# Patient Record
Sex: MALE | Race: WHITE | NOT HISPANIC OR LATINO | Employment: OTHER | URBAN - METROPOLITAN AREA
[De-identification: names, ages, dates, MRNs, and addresses within clinical notes are randomized per-mention and may not be internally consistent; named-entity substitution may affect disease eponyms.]

---

## 2021-05-27 ENCOUNTER — RECORDS - HEALTHEAST (OUTPATIENT)
Dept: ADMINISTRATIVE | Facility: CLINIC | Age: 76
End: 2021-05-27

## 2022-07-15 ENCOUNTER — HOSPITAL ENCOUNTER (OUTPATIENT)
Dept: CT IMAGING | Facility: HOSPITAL | Age: 77
Discharge: HOME | End: 2022-07-15
Admitting: RADIOLOGY
Payer: MEDICARE

## 2022-07-15 DIAGNOSIS — N20.0 CALCULUS OF KIDNEY: ICD-10-CM

## 2022-07-15 PROCEDURE — 74176 CT ABD & PELVIS W/O CONTRAST: CPT

## 2023-10-22 ENCOUNTER — HOSPITAL ENCOUNTER (EMERGENCY)
Facility: HOSPITAL | Age: 78
Discharge: HOME | End: 2023-10-22
Attending: EMERGENCY MEDICINE | Admitting: EMERGENCY MEDICINE
Payer: MEDICARE

## 2023-10-22 ENCOUNTER — APPOINTMENT (OUTPATIENT)
Dept: CT IMAGING | Facility: HOSPITAL | Age: 78
End: 2023-10-22
Attending: EMERGENCY MEDICINE
Payer: MEDICARE

## 2023-10-22 VITALS
RESPIRATION RATE: 16 BRPM | SYSTOLIC BLOOD PRESSURE: 167 MMHG | DIASTOLIC BLOOD PRESSURE: 76 MMHG | OXYGEN SATURATION: 96 % | HEART RATE: 71 BPM | TEMPERATURE: 98.8 F | WEIGHT: 240 LBS | HEIGHT: 70 IN | BODY MASS INDEX: 34.36 KG/M2

## 2023-10-22 DIAGNOSIS — N20.1 URETEROLITHIASIS: ICD-10-CM

## 2023-10-22 DIAGNOSIS — R10.9 FLANK PAIN: ICD-10-CM

## 2023-10-22 LAB
ALBUMIN SERPL BCG-MCNC: 4.3 G/DL (ref 3.5–5.2)
ALBUMIN UR-MCNC: 10 MG/DL
ALP SERPL-CCNC: 64 U/L (ref 40–129)
ALT SERPL W P-5'-P-CCNC: 22 U/L (ref 0–70)
ANION GAP SERPL CALCULATED.3IONS-SCNC: 10 MMOL/L (ref 7–15)
APPEARANCE UR: CLEAR
AST SERPL W P-5'-P-CCNC: 21 U/L (ref 0–45)
BILIRUB SERPL-MCNC: 1.4 MG/DL
BILIRUB UR QL STRIP: NEGATIVE
BUN SERPL-MCNC: 17.4 MG/DL (ref 8–23)
CALCIUM SERPL-MCNC: 9.8 MG/DL (ref 8.8–10.2)
CHLORIDE SERPL-SCNC: 105 MMOL/L (ref 98–107)
COLOR UR AUTO: ABNORMAL
CREAT SERPL-MCNC: 1.09 MG/DL (ref 0.67–1.17)
CRP SERPL-MCNC: 6.8 MG/L
DEPRECATED HCO3 PLAS-SCNC: 26 MMOL/L (ref 22–29)
EGFRCR SERPLBLD CKD-EPI 2021: 69 ML/MIN/1.73M2
ERYTHROCYTE [DISTWIDTH] IN BLOOD BY AUTOMATED COUNT: 13.8 % (ref 10–15)
GLUCOSE SERPL-MCNC: 136 MG/DL (ref 70–99)
GLUCOSE UR STRIP-MCNC: NEGATIVE MG/DL
HCT VFR BLD AUTO: 43.9 % (ref 40–53)
HGB BLD-MCNC: 14.4 G/DL (ref 13.3–17.7)
HGB UR QL STRIP: ABNORMAL
KETONES UR STRIP-MCNC: NEGATIVE MG/DL
LEUKOCYTE ESTERASE UR QL STRIP: NEGATIVE
LIPASE SERPL-CCNC: 13 U/L (ref 13–60)
MCH RBC QN AUTO: 32.7 PG (ref 26.5–33)
MCHC RBC AUTO-ENTMCNC: 32.8 G/DL (ref 31.5–36.5)
MCV RBC AUTO: 100 FL (ref 78–100)
MUCOUS THREADS #/AREA URNS LPF: PRESENT /LPF
NITRATE UR QL: NEGATIVE
PH UR STRIP: 5 [PH] (ref 5–7)
PLATELET # BLD AUTO: 156 10E3/UL (ref 150–450)
POTASSIUM SERPL-SCNC: 4.1 MMOL/L (ref 3.4–5.3)
PROT SERPL-MCNC: 7.7 G/DL (ref 6.4–8.3)
RBC # BLD AUTO: 4.4 10E6/UL (ref 4.4–5.9)
RBC URINE: 68 /HPF
SODIUM SERPL-SCNC: 141 MMOL/L (ref 135–145)
SP GR UR STRIP: 1.02 (ref 1–1.03)
SQUAMOUS EPITHELIAL: <1 /HPF
UROBILINOGEN UR STRIP-MCNC: <2 MG/DL
WBC # BLD AUTO: 7 10E3/UL (ref 4–11)
WBC URINE: 3 /HPF

## 2023-10-22 PROCEDURE — 83690 ASSAY OF LIPASE: CPT | Performed by: EMERGENCY MEDICINE

## 2023-10-22 PROCEDURE — 85027 COMPLETE CBC AUTOMATED: CPT | Performed by: EMERGENCY MEDICINE

## 2023-10-22 PROCEDURE — 250N000011 HC RX IP 250 OP 636: Performed by: EMERGENCY MEDICINE

## 2023-10-22 PROCEDURE — 99285 EMERGENCY DEPT VISIT HI MDM: CPT | Mod: 25

## 2023-10-22 PROCEDURE — 80053 COMPREHEN METABOLIC PANEL: CPT | Performed by: EMERGENCY MEDICINE

## 2023-10-22 PROCEDURE — 86140 C-REACTIVE PROTEIN: CPT | Performed by: EMERGENCY MEDICINE

## 2023-10-22 PROCEDURE — 36415 COLL VENOUS BLD VENIPUNCTURE: CPT | Performed by: EMERGENCY MEDICINE

## 2023-10-22 PROCEDURE — 96374 THER/PROPH/DIAG INJ IV PUSH: CPT

## 2023-10-22 PROCEDURE — 81001 URINALYSIS AUTO W/SCOPE: CPT | Performed by: EMERGENCY MEDICINE

## 2023-10-22 PROCEDURE — 74176 CT ABD & PELVIS W/O CONTRAST: CPT | Mod: MG

## 2023-10-22 RX ORDER — IBUPROFEN 200 MG
400 TABLET ORAL EVERY 6 HOURS
Qty: 56 TABLET | Refills: 0 | Status: SHIPPED | OUTPATIENT
Start: 2023-10-22 | End: 2023-10-29

## 2023-10-22 RX ORDER — OXYCODONE HYDROCHLORIDE 5 MG/1
5 TABLET ORAL EVERY 4 HOURS PRN
Qty: 12 TABLET | Refills: 0 | Status: SHIPPED | OUTPATIENT
Start: 2023-10-22 | End: 2023-10-26

## 2023-10-22 RX ORDER — KETOROLAC TROMETHAMINE 15 MG/ML
15 INJECTION, SOLUTION INTRAMUSCULAR; INTRAVENOUS ONCE
Status: COMPLETED | OUTPATIENT
Start: 2023-10-22 | End: 2023-10-22

## 2023-10-22 RX ORDER — DIMENHYDRINATE 50 MG
50 TABLET ORAL EVERY 6 HOURS PRN
Qty: 28 TABLET | Refills: 0 | Status: SHIPPED | OUTPATIENT
Start: 2023-10-22 | End: 2023-10-29

## 2023-10-22 RX ORDER — ACETAMINOPHEN 500 MG
1000 TABLET ORAL EVERY 6 HOURS
Qty: 56 TABLET | Refills: 0 | Status: SHIPPED | OUTPATIENT
Start: 2023-10-22 | End: 2023-10-29

## 2023-10-22 RX ORDER — DIMENHYDRINATE 50 MG
50 TABLET ORAL AT BEDTIME
Qty: 7 TABLET | Refills: 0 | Status: SHIPPED | OUTPATIENT
Start: 2023-10-22 | End: 2023-10-29

## 2023-10-22 RX ADMIN — KETOROLAC TROMETHAMINE 15 MG: 15 INJECTION INTRAMUSCULAR; INTRAVENOUS at 04:33

## 2023-10-22 ASSESSMENT — ACTIVITIES OF DAILY LIVING (ADL): ADLS_ACUITY_SCORE: 33

## 2023-10-22 NOTE — ED TRIAGE NOTES
Pt reports onset of right sided flank pain on Wednesday. Pt reports it has been getting worse. Hx of prior kidney stones. Pt also reports being constipated. Last BM was on Wednesday. Also reports being nauseated and abdominal bloating. No hx of bowel obstructions.

## 2023-10-22 NOTE — DISCHARGE INSTRUCTIONS
You were seen in the Emergency Department today for flank pain. As we discussed, your CT scan showed that you have a stone in your ureter. It is painful as the stone travels down from your kidney toward your bladder. Your urine did not look infected.      You are being sent with prescriptions for medications to help with recurrent symptoms. Please take as directed. You are also being sent with a strainer to use when you urinate so you can try to catch the stone.      Please return to the ER if you experience fever, worsening pain, inability to keep food/fluids down, and/or for any other new or concerning symptoms, otherwise please follow up with your primary doctor and urologist for recheck. You were given a referral to the [facility] and someone should call your from their clinic to schedule an appointment.    Below is some information you might find useful.     Thank you for choosing [facility]. It was a pleasure taking care of you!

## 2023-10-22 NOTE — ED PROVIDER NOTES
EMERGENCY DEPARTMENT ENCOUNTER      NAME: Poncho Weems  YOB: 1945  MRN: [id number]    FINAL IMPRESSION  1. Ureterolithiasis    2. Flank pain        MEDICAL DECISION MAKING   Pertinent Labs & Imaging studies reviewed. (See chart for details)    Poncho Weems is a 78 year old male who presents for evaluation of flank pain and constipation that been ongoing for the last 4 days.  He reports some associated nausea but denies emesis.  He states that he has not had a bowel movement since his symptoms began which is somewhat atypical for him.  Patient notes that his current symptoms do feel similar to what he has experienced with past kidney stones.  He notes that when he last had imaging, he was told that he had multiple stones remaining in his kidneys.  No associated fever, dysuria, hematuria, or other new complaints. Remainder of history and exam, as below.     Records reviewed.  Patient does have a history of ureterolithiasis/nephrolithiasis.  He has been seen by urology in the past, most recently in August 2021 by Dr. Kirkland.  He was also seen in [place] [facility] clinic on 8/2/2022.  At that time, it was noted that patient had a CT abdomen/pelvis on 6/30/2022 which revealed a stone on the right and 3 stones on the left.  He has a history of cystoscopy, ureteroscopy, and laser lithotripsy in 2021.  He is on tamsulosin 0.4 mg daily.    I considered a broad differential including but not limited to nephrolithiasis/ureterolithiasis, renal colic, pyelonephritis, UTI, hydronephrosis, thoracic or abdominal wall strain, SPARKLE, electrolyte derangement, constipation, obstruction, hernia. I have low suspicion for AAA/dissection or other cardiovascular process given history and exam. Discussed options for workup and management. We agreed on plan for labs, UA, CT abdomen/pelvis.  Patient declined offer for analgesic/antiemetic but will update me if he changes his mind.      ED Course as of 10/22/23 0452   Sun  Oct 22, 2023   0412 UA with Microscopic reflex to Culture(!)  UA notable for blood and RBCs, suggestive of ureterolithiasis.  No evidence of infection.   0412 Comprehensive metabolic panel(!)  CMP reassuring. No evidence of SPARKLE, acidosis, or significant electrolyte derangement. No acute elevation of bilirubin or transaminates to suggest acute hepatobiliary process.   0412 CRP Inflammation(!): 6.80  Concerning for infectious/inflammatory process, possibly stone.   0412 CBC (+ platelets, no diff)  CBC reassuring. No evidence of leukocytosis to suggest systemic infectious/inflammatory process. No acute anemia. PLTs wnl.   0412 Lipase: 13  Lipase within normal limits, symptoms less likely related to acute pancreatitis.   0427 CT Abdomen Pelvis w/o Contrast  CT with 4 mm stone at the right UVJ and associated hydronephrosis.  I suspect this to be etiology of patient's pain as well as hematuria.     Work-up today was notable for the above.  I suspect symptoms are related to 4 mm stone with concurrent constipation.  Patient did have elevated blood pressures while here in the department and some increasing pain after he returned from CT scan.  He was given Toradol with significant improvement in discomfort as well as blood pressure, down to the 160s systolic on recheck.  I reviewed results of labs, UA, and imaging.  We discussed options for ongoing management and I offered admission for symptom control and urology consultation but patient felt comfortable with plan for discharge.  He has been seen by [place] [facility] in the past but was also interested in a referral to [facility].  We will send with prescriptions for analgesic/antiemetic.  Patient is already taking tamsulosin and I encouraged him to continue doing so.    Strict return precautions and follow up recommendations were discussed and all questions were answered. Patient endorsed understanding and was in agreement with plan.        Medical Decision  Making    History:  Supplemental history from: Family Member/Significant Other  External Record(s) reviewed: Documented in chart, if applicable. and Prior Imaging: CT Abdomen Pelvis W/O Contrast performed at [facility] on 07/15/22    Work Up:  Chart documentation includes differential considered and any EKGs or imaging independently interpreted by provider, where specified.  In additional to work up documented, I considered the following work up: Documented in chart, if applicable.    External consultation:  Discussion of management with another provider: N/A    Complicating factors:  Care impacted by chronic illness: N/A  Care affected by social determinants of health: Access to Medical Care    Disposition considerations: Discharge. I prescribed additional prescription strength medication(s) as charted. See documentation for any additional details.      ED COURSE  3:07 AM I met with the patient to gather history and to perform my initial exam. We discussed plans for the ED course, including diagnostic testing and treatment.   4:21 AM Rechecked and updated patient.   4:27 AM Rechecked and updated patient. I discussed plans for discharge with the patient, which they were agreeable to. We discussed supportive cares at home and reasons for return to the ER including new or worsening symptoms. All questions and concerns were addressed. Patient to be discharged by RN.       MEDICATIONS GIVEN IN THE ED  Medications   ketorolac (TORADOL) injection 15 mg (15 mg Intravenous $Given 10/22/23 6761)       NEW PRESCRIPTIONS STARTED AT TODAY'S VISIT  New Prescriptions    ACETAMINOPHEN (TYLENOL) 500 MG TABLET    Take 2 tablets (1,000 mg) by mouth every 6 hours for 7 days    DIMENHYDRINATE (DRAMAMINE) 50 MG TABLET    Take 1 tablet (50 mg) by mouth at bedtime for 7 days    DIMENHYDRINATE (DRAMAMINE) 50 MG TABLET    Take 1 tablet (50 mg) by mouth every 6 hours as needed for other (kidney stone pain  management)    IBUPROFEN (ADVIL/MOTRIN) 200 MG TABLET    Take 2 tablets (400 mg) by mouth every 6 hours for 7 days    OXYCODONE (ROXICODONE) 5 MG TABLET    Take 1 tablet (5 mg) by mouth every 4 hours as needed for severe pain If pain is not improved with acetaminophen and ibuprofen.          =================================================================    Chief Complaint   Patient presents with    Flank Pain    Constipation         HPI:    Patient information was obtained from: Patient and Patient's wife    Use of : N/A     Poncho Weems is a 78 year old male with a pertinent medical history of nephrolithiasis who presents to the ED for evaluation of flank pain and constipation.     Patient reports having intermittent right flank pain and right-sided abdominal pain for approximately 4 days. He endorses associated nausea, but he denies any recent vomiting. He denies taking any medications for these symptoms.     Patient also reports having 4 days worth of constipation and notes his last bowel movement was late afternoon 4 days ago. He denies any history of constipation.     Patient endorses a MH of kidney stones. He additionally endorses recent intermittent hot spells, but he denies any recent urinary issues, chest pain, shortness of breath, or any other complications at this time.     Patient's wife mentions that years ago the patient underwent surgery in order to remove a kidney stone.    Per Chart Review, patient CT Abdomen Pelvis W/O Contrast performed at [facility] on 07/15/22 had the following impression:  1.  Nonobstructing bilateral intrarenal stones, one in the right and 3 on the left.   2.  No other significant findings.         RELEVANT HISTORY, MEDICATIONS, & ALLERGIES   Past medical history, surgical history, family history, medications, and allergies reviewed and pertinent noted in HPI.    REVIEW OF SYSTEMS:  A complete review of systems was performed with  "pertinent positives and negatives noted in the HPI. All other systems negative.     PHYSICAL EXAM:    Vitals: BP (!) 187/86   Pulse 70   Temp 98.8  F (37.1  C) (Oral)   Resp 16   Ht 1.778 m (5' 10\")   Wt 108.9 kg (240 lb)   SpO2 96%   BMI 34.44 kg/m     General: Alert and interactive, comfortable appearing.  HENT: Atraumatic. Full AROM of neck. Conjunctiva clear.   Cardiovascular: Regular rate and rhythm.   Chest/Pulmonary: Normal work of breathing. Speaking in complete sentences. Lungs CTAB. No chest wall tenderness or deformities.  Abdomen: Right lower quadrant abdominal tenderness present. Right CVA tenderness present. Soft, nondistended. No guarding or rebound.  Extremities: Normal AROM of all major joints.  Skin: Warm and dry. Normal skin color.   Neuro: Speech clear. CNs grossly intact. Moves all extremities spontaneously.   Psych: Normal affect/mood, cooperative, memory appropriate.    LAB  Labs Ordered and Resulted from Time of ED Arrival to Time of ED Departure   COMPREHENSIVE METABOLIC PANEL - Abnormal       Result Value    Sodium 141      Potassium 4.1      Carbon Dioxide (CO2) 26      Anion Gap 10      Urea Nitrogen 17.4      Creatinine 1.09      GFR Estimate 69      Calcium 9.8      Chloride 105      Glucose 136 (*)     Alkaline Phosphatase 64      AST 21      ALT 22      Protein Total 7.7      Albumin 4.3      Bilirubin Total 1.4 (*)    ROUTINE UA WITH MICROSCOPIC REFLEX TO CULTURE - Abnormal    Color Urine Light Yellow      Appearance Urine Clear      Glucose Urine Negative      Bilirubin Urine Negative      Ketones Urine Negative      Specific Gravity Urine 1.020      Blood Urine >1.0 mg/dL (*)     pH Urine 5.0      Protein Albumin Urine 10 (*)     Urobilinogen Urine <2.0      Nitrite Urine Negative      Leukocyte Esterase Urine Negative      Mucus Urine Present (*)     RBC Urine 68 (*)     WBC Urine 3      Squamous Epithelials Urine <1     CRP INFLAMMATION - Abnormal    CRP Inflammation 6.80 " (*)    LIPASE - Normal    Lipase 13     CBC WITH PLATELETS - Normal    WBC Count 7.0      RBC Count 4.40      Hemoglobin 14.4      Hematocrit 43.9            MCH 32.7      MCHC 32.8      RDW 13.8      Platelet Count 156         RADIOLOGY  CT Abdomen Pelvis w/o Contrast   Final Result   IMPRESSION:    1.  4 mm stone at the right ureterovesicular junction with associated mild upstream hydroureteronephrosis.   2.  Punctate nonobstructing calyceal calculus of the right inferior pole. Small nonobstructing calyceal calculi of the left inferior pole measuring up to 7 mm.   3.  Diffusely thickened urinary bladder. This is most likely on the basis of chronic outlet obstruction from prostatomegaly, however may be correlated with urinalysis.               I, Mando King, am serving as a scribe to document services personally performed by Dr. Babs Holder based on my observation and the provider's statements to me. I, Babs Holder MD attest that Mando King is acting in a scribe capacity, has observed my performance of the services and has documented them in accordance with my direction.    Babs Holder M.D.  Emergency Medicine  [facility] EMERGENCY DEPARTMENT  [facility] [facility] [id number] [phone]  Dept: [phone]       Babs Holder MD  10/22/23 0452